# Patient Record
(demographics unavailable — no encounter records)

---

## 2024-11-27 NOTE — PLAN
[FreeTextEntry1] : will check routine labs  pt up to date with colonoscopy  PHQ-9 negative  healthy diet and physical activity as tolerated

## 2024-11-27 NOTE — HEALTH RISK ASSESSMENT
[Excellent] : ~his/her~  mood as  excellent [No] : In the past 12 months have you used drugs other than those required for medical reasons? No [No falls in past year] : Patient reported no falls in the past year [0] : 2) Feeling down, depressed, or hopeless: Not at all (0) [PHQ-2 Negative - No further assessment needed] : PHQ-2 Negative - No further assessment needed [Patient unable to screen] : Patient unable to screen [Patient reported colonoscopy was normal] : Patient reported colonoscopy was normal [None] : None [Employed] : employed [] :  [Feels Safe at Home] : Feels safe at home [Fully functional (bathing, dressing, toileting, transferring, walking, feeding)] : Fully functional (bathing, dressing, toileting, transferring, walking, feeding) [Fully functional (using the telephone, shopping, preparing meals, housekeeping, doing laundry, using] : Fully functional and needs no help or supervision to perform IADLs (using the telephone, shopping, preparing meals, housekeeping, doing laundry, using transportation, managing medications and managing finances) [FreeTextEntry1] : refer to HPI  [de-identified] : GI  [[id number]] : 0 [Change in mental status noted] : No change in mental status noted [Reports changes in hearing] : Reports no changes in hearing [Reports changes in vision] : Reports no changes in vision [Reports normal functional visual acuity (ie: able to read med bottle)] : Reports poor functional visual acuity.  [Reports changes in dental health] : Reports no changes in dental health [ColonoscopyDate] : 2023

## 2024-11-27 NOTE — HISTORY OF PRESENT ILLNESS
[FreeTextEntry1] :  patient present for complete physical exam [de-identified] : 58M is here for physical examination. He had a work injury and has neck pain. Denies changes in vision, dizziness, chest pain, palpitations and lower extremity edema.

## 2024-11-27 NOTE — PHYSICAL EXAM
[No Acute Distress] : no acute distress [PERRL] : pupils equal round and reactive to light [Normal Outer Ear/Nose] : the outer ears and nose were normal in appearance [Normal Oropharynx] : the oropharynx was normal [No JVD] : no jugular venous distention [No Lymphadenopathy] : no lymphadenopathy [No Respiratory Distress] : no respiratory distress  [No Accessory Muscle Use] : no accessory muscle use [Clear to Auscultation] : lungs were clear to auscultation bilaterally [Normal Rate] : normal rate  [Regular Rhythm] : with a regular rhythm [Normal S1, S2] : normal S1 and S2 [No Murmur] : no murmur heard [No Abdominal Bruit] : a ~M bruit was not heard ~T in the abdomen [Pedal Pulses Present] : the pedal pulses are present [No Extremity Clubbing/Cyanosis] : no extremity clubbing/cyanosis [Soft] : abdomen soft [Non Tender] : non-tender [Non-distended] : non-distended [No HSM] : no HSM [Normal Bowel Sounds] : normal bowel sounds [Normal Posterior Cervical Nodes] : no posterior cervical lymphadenopathy [Normal Anterior Cervical Nodes] : no anterior cervical lymphadenopathy [No Spinal Tenderness] : no spinal tenderness [Grossly Normal Strength/Tone] : grossly normal strength/tone [No Focal Deficits] : no focal deficits [Normal Gait] : normal gait [Alert and Oriented x3] : oriented to person, place, and time [de-identified] : enlarged neck

## 2025-02-19 NOTE — PHYSICAL EXAM
[Left] : left foot and ankle [2+] : posterior tibialis pulse: 2+ [] : Sensation present to light touch in all distributions [FreeTextEntry9] : Full ROM of toes and knee [de-identified] : Deferred [de-identified] : Uses kneeling scooter

## 2025-02-19 NOTE — HISTORY OF PRESENT ILLNESS
[de-identified] : Body Part: left ankle fracture Length of symptoms/ DOI: 2/10/25-  INJURY Cause of accident/ injury: slipped and fell on ice in the parking lot of [place] [employer] while on his lunch break  Radicular symptoms: pain along the lateral aspect of the ankle up into the calf, has numbness/tingling in the foot Quality of pain: aching Pain at Rest: 5-6/10 Pain with activity/ walkin/10 Pain worsens with: sitting, standing, walking, sleeping, exercising Pain improves with: Ibuprofen, elevating Previous treatments: S/P ORIF on 25 at [facility] Imaging: x-rays at [facility] Current treatments: none Current medications for pain: Ibuprofen and ASA Work status/ occupation: Full Time, - currently not working due to recent surgery Assisted walking device: scooter

## 2025-02-19 NOTE — ASSESSMENT
[FreeTextEntry1] : 59 yo male presents today for eval of his left ankle, S/P left ankle ORIF at [facility] on 2/11/25. XR of the left ankle with intact hardware with fracture in acceptable alignment. Patient with sutures in place today with healed incision.   - Sutures removed today and betadine, steris, and sterile dressing applied over incision site. Patient instructed to keep incision clean, covered, and dry until follow up.   - Cam walker boot provided in the office today. Boot is medically necessary to provide adequate immobilization and healing to the fracture site following surgery  - NWB LLE in the cam walker boot, to be worn at all times except while showering  - Recommend NSAIDs, pain medicine PRN - Recommend rest, ice, compression, elevation - Recommend activity modification, avoid strenuous or high impact activities  Patient was educated on their diagnosis today. Emphasized the importance of gentle stretching and strengthening. Patient expressed understanding and all questions were answered today.   Worker's compensation 100% temporarily disabled.   Follow up in 2 weeks for incision check

## 2025-02-19 NOTE — REASON FOR VISIT
[FreeTextEntry2] : left ankle fracture after slipping and falling on ice in the parking lot of the [employer] while on his lunch break on 2/10/25-  INJURY

## 2025-02-25 NOTE — PLAN
[FreeTextEntry1] : reviewed [place] report and labs  reviewed ortho consult note  advised to monitor left ankle for swelling, pain and SOB  will continue  mg PO QD  may take Ibuprofen 800 mg PO BID with food for moderate pain  will take Percocet PRN severe pain PO at bedtime

## 2025-02-25 NOTE — HISTORY OF PRESENT ILLNESS
[Post-hospitalization from ___ Hospital] : Post-hospitalization from [unfilled] Hospital [Admitted on: ___] : The patient was admitted on [unfilled] [Discharged on ___] : discharged on [unfilled] [FreeTextEntry2] : Pt slipped on ice dislocated and broke left ankle BIBA to [facility], He had surgery on 2/11/25. He is taking oxycodone with mild relief at night and full dose ASA. Denies paresthesia, SOB and calf pain.

## 2025-02-25 NOTE — PHYSICAL EXAM
[No Respiratory Distress] : no respiratory distress  [No Accessory Muscle Use] : no accessory muscle use [Clear to Auscultation] : lungs were clear to auscultation bilaterally [Normal Rate] : normal rate  [Regular Rhythm] : with a regular rhythm [Normal S1, S2] : normal S1 and S2 [Alert and Oriented x3] : oriented to person, place, and time [de-identified] : left ankle boot

## 2025-03-06 NOTE — HISTORY OF PRESENT ILLNESS
[de-identified] : Body part: left ankle fracture, S/P ORIF on 2/11/25 Better/ Worse/ Same since last visit: better Treatments since last visit: none Difficulty with: daily activities, sleeping Radicular symptoms: none Current medications for pain: Ibuprofen 800mg, Oxycodone PRN Assistive walking device: scooter, wearing tall camboot   Today's Pain:  sitting 4/10   Current work status (if Workers Comp): not working due to recent surgery

## 2025-03-06 NOTE — PHYSICAL EXAM
[Left] : left foot and ankle [2+] : posterior tibialis pulse: 2+ [] : clean and dry incisions [FreeTextEntry9] : Full ROM of toes and knee [de-identified] : Deferred [de-identified] : Uses kneeling scooter

## 2025-03-06 NOTE — ASSESSMENT
[FreeTextEntry1] : 57 yo male presents today for eval of his left ankle, S/P left ankle ORIF at [facility] on 2/11/25. XR of the left ankle with intact hardware with fracture in acceptable alignment. Incision clean and dry, with progression in healing. Dressing changes performed today.   - Dressing removed today and betadine, steris applied over incision site. Patient instructed to keep incision clean, covered, and dry until follow up.   - NWB LLE in the cam walker boot, to be worn at all times except while showering  - Recommend NSAIDs, pain medicine PRN - Recommend rest, ice, compression, elevation - Recommend activity modification, avoid strenuous or high impact activities  Patient was educated on their diagnosis today. Emphasized the importance of gentle stretching and strengthening. Patient expressed understanding and all questions were answered today.   Worker's compensation 100% temporarily disabled.   Follow up in 2 weeks

## 2025-03-20 NOTE — PHYSICAL EXAM
[Left] : left foot and ankle [2+] : posterior tibialis pulse: 2+ [] : no laceration [FreeTextEntry3] : Small area of delayed healing in the incision, no signs of infection [FreeTextEntry9] : Full ROM of toes and knee [de-identified] : Deferred [de-identified] : Uses kneeling scooter

## 2025-03-20 NOTE — HISTORY OF PRESENT ILLNESS
[de-identified] : Body part: left ankle fracture, S/P ORIF on 2/11/25 Better/ Worse/ Same since last visit: better Treatments since last visit: none Difficulty with: daily activities, sleeping Radicular symptoms: none Current medications for pain: Ibuprofen 800mg, Oxycodone PRN Assistive walking device: scooter, wearing tall camboot   Today's Pain:  4/10   Current work status (if Workers Comp): not working due to recent surgery

## 2025-03-20 NOTE — ASSESSMENT
[FreeTextEntry1] : 57 yo male presents today for eval of his left ankle, S/P left ankle ORIF at [facility] on 2/11/25. XR of the left ankle with intact hardware with fracture in acceptable alignment. Incision clean and dry, with progression in healing. Dressing changes performed today.   - Dressing removed today and betadine, steris applied over incision site. Patient instructed to keep incision clean, and dry. Provided patient with Santyl Rx at this time and discussed proper use.   - WBAT LLE with CAM Boot.   - Recommend physical therapy to regain range of motion, strengthening and symptomatic improvement. Prescription given in office today.   - Recommend NSAIDs, pain medicine PRN - Recommend rest, ice, compression, elevation - Recommend activity modification, avoid strenuous or high impact activities  Patient was educated on their diagnosis today. Emphasized the importance of gentle stretching and strengthening. Patient expressed understanding and all questions were answered today.   Worker's compensation 50% temporarily disabled. Allow patient to return to work 4/14/25, light duty, no heavy lifting, no excessive bending. Allow patient to wear CAM Boot while at work.   Follow up in 1 month

## 2025-03-20 NOTE — IMAGING
[Left] : left ankle [No loss of surgical correlation. Bony alignment acceptable. Hardware in appropriate position] : No loss of surgical correlation. Bony alignment acceptable. Hardware in appropriate position [The fracture is in acceptable alignment. There is progression in healing seen] : The fracture is in acceptable alignment. There is progression in healing seen

## 2025-04-25 NOTE — HISTORY OF PRESENT ILLNESS
[de-identified] : Body part: left ankle fracture, S/P ORIF on 2/11/25 Better/ Worse/ Same since last visit: better Treatments since last visit: PT 3x a week with relief Difficulty with: daily activities, prolonged standing/walking Radicular symptoms: none Current medications for pain: Ibuprofen 800mg, Oxycodone PRN Assistive walking device: WB with tall camboot   Today's Pain:  3/10   Current work status (if Workers Comp): not working due to recent surgery

## 2025-04-25 NOTE — ASSESSMENT
[FreeTextEntry1] : 58 yo male presents today for eval of his left ankle, S/P left ankle ORIF at [facility] on 2/11/25. XR of the left ankle with intact hardware with healed fracture. He has had improvements in incision healing with Santly. He has improvements in pain following PT. We discussed continuing both wound care and PT today.   - DC CAM walking boot, may use normal shoe  - Recommend physical therapy to regain range of motion, strengthening and symptomatic improvement. Prescription given in office today.   - Discussed home exercises, emphasis on strengthening  - Recommend NSAIDs, pain medicine PRN - Recommend rest, ice, compression, elevation - Recommend activity modification, avoid strenuous or high impact activities  Patient was educated on their diagnosis today. Emphasized the importance of gentle stretching and strengthening. Patient expressed understanding and all questions were answered today.   Worker's compensation 50% temporarily disabled. He attempted to return to work with restrictions and was not accommodated. Likely to be ready for full duty sometime in May.    Follow up in 2 months

## 2025-04-25 NOTE — PHYSICAL EXAM
[Left] : left foot and ankle [2+] : posterior tibialis pulse: 2+ [] : no lateral malleolus tenderness [FreeTextEntry3] : Granulation tissue at middle of incision [FreeTextEntry9] : Full ROM of toes and knee [de-identified] : Deferred [de-identified] : Uses kneeling scooter

## 2025-05-15 NOTE — PHYSICAL EXAM
[Left] : left foot and ankle [2+] : posterior tibialis pulse: 2+ [] : no lateral malleolus tenderness [FreeTextEntry3] : Scabbed tissue at middle of incision [de-identified] : Deferred [de-identified] : Uses kneeling scooter  [TWNoteComboBox7] : dorsiflexion 10 degrees [de-identified] : plantar flexion 20 degrees [de-identified] : inversion 5 degrees [de-identified] : eversion 5 degrees

## 2025-05-15 NOTE — ASSESSMENT
[FreeTextEntry1] : 60 yo male presents today for eval of his left ankle, S/P left ankle ORIF at [facility] on 2/11/25. He has improving ROM with PT but still limited on plantar and dorsiflexion. We discussed continuing PT and home stretching for relief.   - WBAT LLE   - Continue physical therapy to regain range of motion, strengthening and symptomatic improvement. Prescription given in office today.   - Discussed home exercises, emphasis on strengthening  - Recommend NSAIDs, pain medicine PRN - Recommend rest, ice, compression, elevation - Recommend activity modification, avoid strenuous or high impact activities  Patient was educated on their diagnosis today. Emphasized the importance of gentle stretching and strengthening. Patient expressed understanding and all questions were answered today.   Worker's compensation 0% temporarily disabled.  Patient may return to work full duty, no restrictions on 6/9/2025.   Follow up in 2 months

## 2025-05-15 NOTE — HISTORY OF PRESENT ILLNESS
[de-identified] : Body part: left ankle fracture, S/P ORIF on 2/11/25 Better/ Worse/ Same since last visit: better Treatments since last visit: PT 2x  Difficulty with: daily activities, prolonged standing/walking Radicular symptoms: none Current medications for pain: Ibuprofen 800mg, Oxycodone PRN Assistive walking device: walking with regular shoe - still very swollen   Today's Pain:  3/10   Current work status (if Workers Comp): currently not working - Hopes to be cleared for June 9th

## 2025-06-26 NOTE — PHYSICAL EXAM
[Left] : left foot and ankle [2+] : posterior tibialis pulse: 2+ [] : no lateral malleolus tenderness [FreeTextEntry3] : Scabbed tissue at middle of incision [de-identified] : Deferred [de-identified] : Uses kneeling scooter  [TWNoteComboBox7] : dorsiflexion 10 degrees [de-identified] : plantar flexion 25 degrees [de-identified] : inversion 15 degrees [de-identified] : eversion 15 degrees

## 2025-06-26 NOTE — HISTORY OF PRESENT ILLNESS
[de-identified] : Body part: left ankle fracture, S/P ORIF on 2/11/25 Better/ Worse/ Same since last visit: better Treatments since last visit: HEP Difficulty with: walking on uneven ground.  Radicular symptoms: none Current medications for pain: none  Assistive walking device: walking with regular shoe - still swollen   Today's Pain:  6/10   Current work status (if Workers Comp): currently working

## 2025-06-26 NOTE — ASSESSMENT
[FreeTextEntry1] : 58 yo male presents today for eval of his left ankle, S/P left ankle ORIF at [facility] on 2/11/25. He has continued improvements in pain and ROM following PT as per last visit.   - WBAT LLE   - Continue physical therapy to regain range of motion, strengthening and symptomatic improvement. Prescription given in office today.   - Discussed home exercises, emphasis on strengthening  - Recommend NSAIDs, pain medicine PRN - Recommend rest, ice, compression, elevation - Recommend activity modification, avoid strenuous or high impact activities  Patient was educated on their diagnosis today. Emphasized the importance of gentle stretching and strengthening. Patient expressed understanding and all questions were answered today.   Worker's compensation 0% temporarily disabled.   Follow up in 2 months